# Patient Record
Sex: MALE | Race: OTHER | HISPANIC OR LATINO | URBAN - METROPOLITAN AREA
[De-identification: names, ages, dates, MRNs, and addresses within clinical notes are randomized per-mention and may not be internally consistent; named-entity substitution may affect disease eponyms.]

---

## 2019-08-22 ENCOUNTER — INPATIENT (INPATIENT)
Facility: HOSPITAL | Age: 59
LOS: 0 days | Discharge: ANOTHER HOSPITAL | DRG: 282 | End: 2019-08-23
Attending: INTERNAL MEDICINE | Admitting: INTERNAL MEDICINE
Payer: COMMERCIAL

## 2019-08-22 VITALS
HEART RATE: 66 BPM | TEMPERATURE: 98 F | DIASTOLIC BLOOD PRESSURE: 93 MMHG | WEIGHT: 210.1 LBS | SYSTOLIC BLOOD PRESSURE: 155 MMHG | RESPIRATION RATE: 18 BRPM

## 2019-08-22 DIAGNOSIS — I21.4 NON-ST ELEVATION (NSTEMI) MYOCARDIAL INFARCTION: ICD-10-CM

## 2019-08-22 DIAGNOSIS — E78.5 HYPERLIPIDEMIA, UNSPECIFIED: ICD-10-CM

## 2019-08-22 DIAGNOSIS — I10 ESSENTIAL (PRIMARY) HYPERTENSION: ICD-10-CM

## 2019-08-22 DIAGNOSIS — Z29.9 ENCOUNTER FOR PROPHYLACTIC MEASURES, UNSPECIFIED: ICD-10-CM

## 2019-08-22 LAB
ALBUMIN SERPL ELPH-MCNC: 4 G/DL — SIGNIFICANT CHANGE UP (ref 3.5–5)
ALP SERPL-CCNC: 79 U/L — SIGNIFICANT CHANGE UP (ref 40–120)
ALT FLD-CCNC: 58 U/L DA — SIGNIFICANT CHANGE UP (ref 10–60)
ANION GAP SERPL CALC-SCNC: 5 MMOL/L — SIGNIFICANT CHANGE UP (ref 5–17)
APTT BLD: 32.4 SEC — SIGNIFICANT CHANGE UP (ref 27.5–36.3)
AST SERPL-CCNC: 42 U/L — HIGH (ref 10–40)
BASOPHILS # BLD AUTO: 0.04 K/UL — SIGNIFICANT CHANGE UP (ref 0–0.2)
BASOPHILS NFR BLD AUTO: 0.4 % — SIGNIFICANT CHANGE UP (ref 0–2)
BILIRUB SERPL-MCNC: 0.5 MG/DL — SIGNIFICANT CHANGE UP (ref 0.2–1.2)
BUN SERPL-MCNC: 18 MG/DL — SIGNIFICANT CHANGE UP (ref 7–18)
CALCIUM SERPL-MCNC: 10 MG/DL — SIGNIFICANT CHANGE UP (ref 8.4–10.5)
CHLORIDE SERPL-SCNC: 103 MMOL/L — SIGNIFICANT CHANGE UP (ref 96–108)
CK MB BLD-MCNC: 2.5 % — SIGNIFICANT CHANGE UP (ref 0–3.5)
CK MB BLD-MCNC: 3.1 % — SIGNIFICANT CHANGE UP (ref 0–3.5)
CK MB CFR SERPL CALC: 4.2 NG/ML — HIGH (ref 0–3.6)
CK MB CFR SERPL CALC: 5.5 NG/ML — HIGH (ref 0–3.6)
CK SERPL-CCNC: 170 U/L — SIGNIFICANT CHANGE UP (ref 35–232)
CK SERPL-CCNC: 177 U/L — SIGNIFICANT CHANGE UP (ref 35–232)
CO2 SERPL-SCNC: 33 MMOL/L — HIGH (ref 22–31)
CREAT SERPL-MCNC: 1.02 MG/DL — SIGNIFICANT CHANGE UP (ref 0.5–1.3)
EOSINOPHIL # BLD AUTO: 0.05 K/UL — SIGNIFICANT CHANGE UP (ref 0–0.5)
EOSINOPHIL NFR BLD AUTO: 0.5 % — SIGNIFICANT CHANGE UP (ref 0–6)
GLUCOSE SERPL-MCNC: 120 MG/DL — HIGH (ref 70–99)
HCT VFR BLD CALC: 44.4 % — SIGNIFICANT CHANGE UP (ref 39–50)
HGB BLD-MCNC: 14.6 G/DL — SIGNIFICANT CHANGE UP (ref 13–17)
IMM GRANULOCYTES NFR BLD AUTO: 0.2 % — SIGNIFICANT CHANGE UP (ref 0–1.5)
INR BLD: 1.02 RATIO — SIGNIFICANT CHANGE UP (ref 0.88–1.16)
LYMPHOCYTES # BLD AUTO: 1.88 K/UL — SIGNIFICANT CHANGE UP (ref 1–3.3)
LYMPHOCYTES # BLD AUTO: 19.1 % — SIGNIFICANT CHANGE UP (ref 13–44)
MCHC RBC-ENTMCNC: 29.1 PG — SIGNIFICANT CHANGE UP (ref 27–34)
MCHC RBC-ENTMCNC: 32.9 GM/DL — SIGNIFICANT CHANGE UP (ref 32–36)
MCV RBC AUTO: 88.4 FL — SIGNIFICANT CHANGE UP (ref 80–100)
MONOCYTES # BLD AUTO: 0.73 K/UL — SIGNIFICANT CHANGE UP (ref 0–0.9)
MONOCYTES NFR BLD AUTO: 7.4 % — SIGNIFICANT CHANGE UP (ref 2–14)
NEUTROPHILS # BLD AUTO: 7.11 K/UL — SIGNIFICANT CHANGE UP (ref 1.8–7.4)
NEUTROPHILS NFR BLD AUTO: 72.4 % — SIGNIFICANT CHANGE UP (ref 43–77)
NRBC # BLD: 0 /100 WBCS — SIGNIFICANT CHANGE UP (ref 0–0)
NT-PROBNP SERPL-SCNC: 87 PG/ML — SIGNIFICANT CHANGE UP (ref 0–125)
PLATELET # BLD AUTO: 222 K/UL — SIGNIFICANT CHANGE UP (ref 150–400)
POTASSIUM SERPL-MCNC: 3.8 MMOL/L — SIGNIFICANT CHANGE UP (ref 3.5–5.3)
POTASSIUM SERPL-SCNC: 3.8 MMOL/L — SIGNIFICANT CHANGE UP (ref 3.5–5.3)
PROT SERPL-MCNC: 8.4 G/DL — HIGH (ref 6–8.3)
PROTHROM AB SERPL-ACNC: 11.3 SEC — SIGNIFICANT CHANGE UP (ref 10–12.9)
RBC # BLD: 5.02 M/UL — SIGNIFICANT CHANGE UP (ref 4.2–5.8)
RBC # FLD: 13.8 % — SIGNIFICANT CHANGE UP (ref 10.3–14.5)
SODIUM SERPL-SCNC: 141 MMOL/L — SIGNIFICANT CHANGE UP (ref 135–145)
TROPONIN I SERPL-MCNC: 1.86 NG/ML — HIGH (ref 0–0.04)
TROPONIN I SERPL-MCNC: 2.45 NG/ML — HIGH (ref 0–0.04)
WBC # BLD: 9.83 K/UL — SIGNIFICANT CHANGE UP (ref 3.8–10.5)
WBC # FLD AUTO: 9.83 K/UL — SIGNIFICANT CHANGE UP (ref 3.8–10.5)

## 2019-08-22 PROCEDURE — 71046 X-RAY EXAM CHEST 2 VIEWS: CPT | Mod: 26

## 2019-08-22 PROCEDURE — 99284 EMERGENCY DEPT VISIT MOD MDM: CPT

## 2019-08-22 PROCEDURE — 71275 CT ANGIOGRAPHY CHEST: CPT | Mod: 26

## 2019-08-22 PROCEDURE — 93010 ELECTROCARDIOGRAM REPORT: CPT | Mod: 76

## 2019-08-22 PROCEDURE — 70450 CT HEAD/BRAIN W/O DYE: CPT | Mod: 26

## 2019-08-22 RX ORDER — METOPROLOL TARTRATE 50 MG
25 TABLET ORAL
Refills: 0 | Status: DISCONTINUED | OUTPATIENT
Start: 2019-08-22 | End: 2019-08-23

## 2019-08-22 RX ORDER — ATORVASTATIN CALCIUM 80 MG/1
40 TABLET, FILM COATED ORAL AT BEDTIME
Refills: 0 | Status: DISCONTINUED | OUTPATIENT
Start: 2019-08-22 | End: 2019-08-23

## 2019-08-22 RX ORDER — LISINOPRIL 2.5 MG/1
2.5 TABLET ORAL DAILY
Refills: 0 | Status: DISCONTINUED | OUTPATIENT
Start: 2019-08-22 | End: 2019-08-23

## 2019-08-22 RX ORDER — ASPIRIN/CALCIUM CARB/MAGNESIUM 324 MG
81 TABLET ORAL DAILY
Refills: 0 | Status: DISCONTINUED | OUTPATIENT
Start: 2019-08-22 | End: 2019-08-23

## 2019-08-22 RX ORDER — HEPARIN SODIUM 5000 [USP'U]/ML
INJECTION INTRAVENOUS; SUBCUTANEOUS
Qty: 25000 | Refills: 0 | Status: DISCONTINUED | OUTPATIENT
Start: 2019-08-22 | End: 2019-08-23

## 2019-08-22 RX ORDER — ASPIRIN/CALCIUM CARB/MAGNESIUM 324 MG
325 TABLET ORAL DAILY
Refills: 0 | Status: COMPLETED | OUTPATIENT
Start: 2019-08-22 | End: 2019-08-22

## 2019-08-22 RX ADMIN — HEPARIN SODIUM 1000 UNIT(S)/HR: 5000 INJECTION INTRAVENOUS; SUBCUTANEOUS at 17:41

## 2019-08-22 RX ADMIN — ATORVASTATIN CALCIUM 40 MILLIGRAM(S): 80 TABLET, FILM COATED ORAL at 21:47

## 2019-08-22 RX ADMIN — Medication 81 MILLIGRAM(S): at 21:47

## 2019-08-22 RX ADMIN — Medication 325 MILLIGRAM(S): at 14:42

## 2019-08-22 RX ADMIN — LISINOPRIL 2.5 MILLIGRAM(S): 2.5 TABLET ORAL at 21:47

## 2019-08-22 RX ADMIN — Medication 25 MILLIGRAM(S): at 18:08

## 2019-08-22 NOTE — ED PROVIDER NOTE - OBJECTIVE STATEMENT
58 y/o with PMHx of HTN, HLD and no significant PSHx c/o CP and HA over the past 5 days. Pt describes his chest pain as a "L sided pressure that radiates down his L arm". Pt went to urgent care, had blood work/CXR, was D/C and told to F/u with PMD. Pt states pain has been persistent since onset and is unable to sleep due to  discomfort. Pt denies fever, cough, n/v/d, diaphoresis or any other acute complaints. NKDA.

## 2019-08-22 NOTE — H&P ADULT - HISTORY OF PRESENT ILLNESS
patient is 59 year old male has past medical history of htn, hld came in with chest pain 4 days.   chest pain started on Sunday while he was relaxing in his bed, 8-9 on intensity, left side, radiates to left and left neck and head, comes and goes, aggravated by exertion, relives with rest, pt did not take any pain medication. currently pt denies any chest pain.   patient went [facility] on Sunday when the chest pain started he had the work up and he was discharged.   pt has headache, throbbing in nature, 8-9 on intensity, usually occurs with chest pain but currently has headache with out chets pain, earlier he got pain medication in the ED bt did not help. pt is complaint with medication.   pt denies fever, cough, sob, palpitation, dizziness, n/v/d, abdominal pain, urinary problems.  ED course in the ED pt had ekg showed nsr, lvh, non specific st abnormality troponin was elevated to 1.86, pt was given aspirin.

## 2019-08-22 NOTE — PATIENT PROFILE ADULT - FALL HARM RISK TYPE OF ASSESSMENT
Pt initially came into PACU with a frequent hacky dry cough but has responded nicely to lidocane neb..et albuterol nebs.Pt offered no further c/o pain or nausia.. All appropriate sign outs obtained.. Ready for tx to 3C     admission

## 2019-08-22 NOTE — H&P ADULT - PROBLEM SELECTOR PLAN 1
chest pain, radiates to left, worsens with exertion   currently not in pain   ekg nsr  trop 1.8-->2.4--> will f/u t3  pt started on heparin drip for NSTEMI  c/w asa + lipitor + bb + anticoagulation   pt is not in pain  pt is to transfer to tomorrow for cardiac cath in am

## 2019-08-22 NOTE — H&P ADULT - PROBLEM SELECTOR PLAN 4
IMPROVE VTE Individual Risk Assessment  RISK                                                         Points  [  ] Previous VTE                                      3  [  ] Thrombophilia                                   2  [  ] Lower limb paralysis                         2 (unable to hold up >15 seconds)    [  ] Current Cancer                                  2       (within 6 months)  [  ] Immobilization > 24 hrs                    1  [  ] ICU/CCU stay > 24 hrs                         1  [  ] Age > 60                                              1  IMPROVE VTE Score 0  pt is on heparin drip for acs

## 2019-08-22 NOTE — CONSULT NOTE ADULT - ASSESSMENT
59 year old male has past medical history of htn, hld came in with chest pain 4 days,non-stemi.  1.Tele monitoring.  2.CE q 8h until peak.  3.Echocardiogram.  4.Heparin drip.  5.ASA,b blocker,statin.  6.Add low dose ACE.  7.PPI.  8.Cardiac cath in am.  9.Aortic aneurysm-CT in 6mo for follow-up.

## 2019-08-22 NOTE — H&P ADULT - NSHPPHYSICALEXAM_GEN_ALL_CORE
PHYSICAL EXAM:  GENERAL: NAD, well-developed  HEAD:  Atraumatic, Normocephalic  EYES: EOMI, PERRLA, conjunctiva and sclera clear  NECK: Supple, No JVD  CHEST/LUNG: Clear to auscultation bilaterally; No wheeze  HEART: Regular rate and rhythm; s1+ s2+  ABDOMEN: Soft, Nontender, Nondistended; Bowel sounds present  EXTREMITIES:, No clubbing, cyanosis, or edema  PSYCH: AAOx3  NEUROLOGY: non-focal  SKIN: No rashes or lesions

## 2019-08-22 NOTE — H&P ADULT - NSHPSOCIALHISTORY_GEN_ALL_CORE
pt denies smoking, drinks alcohol socially, denies substance abuse. lives with family, works in maintenance department

## 2019-08-22 NOTE — ED PROVIDER NOTE - CLINICAL SUMMARY MEDICAL DECISION MAKING FREE TEXT BOX
60 y/o pt presenting to ED with persistent CP and HA. Will do labs, including D-dimer, head CT, CXR and reassess.

## 2019-08-22 NOTE — CONSULT NOTE ADULT - SUBJECTIVE AND OBJECTIVE BOX
CHIEF COMPLAINT:Patient is a 59y old  Male who presents with a chief complaint of chest pain .      HPI:Patient is 59 year old male has past medical history of htn, hld came in with chest pain 4 days.   chest pain started on Sunday while he was relaxing in his bed, 8-9 on intensity, left side, radiates to left and left neck and head, comes and goes, aggravated by exertion, relives with rest, pt did not take any pain medication. currently pt denies any chest pain.   patient went [facility] on Sunday when the chest pain started he had the work up and he was discharged.   pt has headache, throbbing in nature, 8-9 on intensity, usually occurs with chest pain but currently has headache with out chets pain, earlier he got pain medication in the ED bt did not help. pt is complaint with medication.   pt denies fever, cough, sob, palpitation, dizziness, n/v/d, abdominal pain, urinary problems.  ED course in the ED pt had ekg showed nsr, lvh, non specific st abnormality troponin was elevated to 1.86, pt was given aspirin. (22 Aug 2019 16:53)      PAST MEDICAL & SURGICAL HISTORY:  HLD (hyperlipidemia)  HTN (hypertension)        MEDICATIONS  (STANDING):  aspirin enteric coated 81 milliGRAM(s) Oral daily  atorvastatin 40 milliGRAM(s) Oral at bedtime  heparin  Infusion.  Unit(s)/Hr (10 mL/Hr) IV Continuous <Continuous>  metoprolol tartrate 25 milliGRAM(s) Oral two times a day    MEDICATIONS  (PRN):      FAMILY HISTORY:No hx of CAD      SOCIAL HISTORY:    [ x] Non-smoker    [x ] Alcohol-denies    Allergies    No Known Allergies    Intolerances    	    REVIEW OF SYSTEMS:  CONSTITUTIONAL: No fever, weight loss, or fatigue  EYES: No eye pain, visual disturbances, or discharge  ENT:  No difficulty hearing, tinnitus, vertigo; No sinus or throat pain  NECK: No pain or stiffness  RESPIRATORY: No cough, wheezing, chills or hemoptysis; No Shortness of Breath  CARDIOVASCULAR: + chest pain, palpitations, passing out, dizziness, or leg swelling  GASTROINTESTINAL: No abdominal or epigastric pain. No nausea, vomiting, or hematemesis; No diarrhea or constipation. No melena or hematochezia.  GENITOURINARY: No dysuria, frequency, hematuria, or incontinence  NEUROLOGICAL: +headaches, No memory loss, loss of strength, numbness, or tremors  SKIN: No itching, burning, rashes, or lesions   LYMPH Nodes: No enlarged glands  ENDOCRINE: No heat or cold intolerance; No hair loss  MUSCULOSKELETAL: No joint pain or swelling; No muscle, back, or extremity pain  PSYCHIATRIC: No depression, anxiety, mood swings, or difficulty sleeping  HEME/LYMPH: No easy bruising, or bleeding gums  ALLERGY AND IMMUNOLOGIC: No hives or eczema	      PHYSICAL EXAM:  T(C): 36.5 (08-22-19 @ 17:58), Max: 36.8 (08-22-19 @ 10:17)  HR: 74 (08-22-19 @ 17:58) (66 - 74)  BP: 144/94 (08-22-19 @ 17:58) (137/84 - 155/93)  RR: 17 (08-22-19 @ 17:58) (17 - 18)  SpO2: 98% (08-22-19 @ 17:58) (98% - 99%)  Wt(kg): --  I&O's Summary      Appearance: Normal	  HEENT:   Normal oral mucosa, PERRL, EOMI	  Lymphatic: No lymphadenopathy  Cardiovascular: Normal S1 S2, 2/6sm  Respiratory: Lungs clear to auscultation	  Psychiatry: A & O x 3, Mood & affect appropriate  Gastrointestinal:  Soft, Non-tender, + BS	  Skin: No rashes, No ecchymoses, No cyanosis	  Neurologic: Non-focal  Extremities: Normal range of motion, No clubbing, cyanosis or edema  Vascular: Peripheral pulses palpable 2+ bilaterally  	    ECG:  	nsr,lvh,no acute st-t canges    	  LABS:	 	    CARDIAC MARKERS:  CARDIAC MARKERS ( 22 Aug 2019 17:06 )  2.450 ng/mL / x     / 170 U/L / x     / 4.2 ng/mL  CARDIAC MARKERS ( 22 Aug 2019 12:08 )  1.860 ng/mL / x     / 177 U/L / x     / 5.5 ng/mL                              14.6   9.83  )-----------( 222      ( 22 Aug 2019 12:08 )             44.4     08-22    141  |  103  |  18  ----------------------------<  120<H>  3.8   |  33<H>  |  1.02    Ca    10.0      22 Aug 2019 12:08    TPro  8.4<H>  /  Alb  4.0  /  TBili  0.5  /  DBili  x   /  AST  42<H>  /  ALT  58  /  AlkPhos  79  08-22    proBNP: Serum Pro-Brain Natriuretic Peptide: 87 pg/mL (08-22 @ 12:08)        EXAM:  CT ANGIO CHEST (W)AW IC                            PROCEDURE DATE:  08/22/2019          INTERPRETATION:  Pulmonary CTA    Indication: Chest pain and shortness of breath.    Technique: Axial multidetector CT images of the chest are acquired from   the thoracic inlet to the upper abdomen following the administration of   IV contrast (60 cc Omnipaque-350, 40 cc discarded) according to our   pulmonary embolism protocol. Subsequent MIP is also reconstructed for   evaluation.    Comparison: None.    Findings: Ectasia of the ascending aorta at 4.2 cm in diameter. No   evidence for aortic dissection. Evaluation of the pulmonary arteries and   pulmonary parenchyma is limited by respiratory motion artifact. No gross   suspicious filling defectin the central pulmonary arteries to suggest   pulmonary embolism.    No pleural or pericardial effusion. Mild cardiomegaly. No enlarged   mediastinal, hilar, or axillary lymph node.    The trachea and central bronchi are grossly patent.    No evidence for pneumothorax or pulmonary consolidation. Small bilateral   atelectasis.    Limited sections through the upper abdomen demonstrate hepatic steatosis.   Nonspecific mild adrenal thickening bilaterally.    Impression: No gross pulmonary embolism is identified.    Ectasia of the ascending aorta.    Hepatic steatosis..

## 2019-08-23 ENCOUNTER — INPATIENT (INPATIENT)
Facility: HOSPITAL | Age: 59
LOS: 0 days | Discharge: HOME | DRG: 247 | End: 2019-08-24
Attending: INTERNAL MEDICINE | Admitting: INTERNAL MEDICINE
Payer: COMMERCIAL

## 2019-08-23 VITALS
DIASTOLIC BLOOD PRESSURE: 88 MMHG | WEIGHT: 210.1 LBS | HEART RATE: 75 BPM | OXYGEN SATURATION: 97 % | RESPIRATION RATE: 18 BRPM | SYSTOLIC BLOOD PRESSURE: 149 MMHG | HEIGHT: 70 IN | TEMPERATURE: 98 F

## 2019-08-23 VITALS
SYSTOLIC BLOOD PRESSURE: 133 MMHG | DIASTOLIC BLOOD PRESSURE: 91 MMHG | RESPIRATION RATE: 18 BRPM | HEART RATE: 76 BPM | TEMPERATURE: 98 F | OXYGEN SATURATION: 97 %

## 2019-08-23 DIAGNOSIS — I21.4 NON-ST ELEVATION (NSTEMI) MYOCARDIAL INFARCTION: ICD-10-CM

## 2019-08-23 PROBLEM — E78.5 HYPERLIPIDEMIA, UNSPECIFIED: Chronic | Status: ACTIVE | Noted: 2019-08-22

## 2019-08-23 PROBLEM — I10 ESSENTIAL (PRIMARY) HYPERTENSION: Chronic | Status: ACTIVE | Noted: 2019-08-22

## 2019-08-23 LAB
ALBUMIN SERPL ELPH-MCNC: 3.8 G/DL — SIGNIFICANT CHANGE UP (ref 3.5–5)
ALP SERPL-CCNC: 75 U/L — SIGNIFICANT CHANGE UP (ref 40–120)
ALT FLD-CCNC: 50 U/L DA — SIGNIFICANT CHANGE UP (ref 10–60)
ANION GAP SERPL CALC-SCNC: 6 MMOL/L — SIGNIFICANT CHANGE UP (ref 5–17)
APTT BLD: 42.9 SEC — HIGH (ref 27.5–36.3)
APTT BLD: 61.3 SEC — HIGH (ref 27.5–36.3)
AST SERPL-CCNC: 29 U/L — SIGNIFICANT CHANGE UP (ref 10–40)
BASOPHILS # BLD AUTO: 0.04 K/UL — SIGNIFICANT CHANGE UP (ref 0–0.2)
BASOPHILS NFR BLD AUTO: 0.5 % — SIGNIFICANT CHANGE UP (ref 0–2)
BILIRUB SERPL-MCNC: 0.5 MG/DL — SIGNIFICANT CHANGE UP (ref 0.2–1.2)
BUN SERPL-MCNC: 17 MG/DL — SIGNIFICANT CHANGE UP (ref 7–18)
CALCIUM SERPL-MCNC: 9.7 MG/DL — SIGNIFICANT CHANGE UP (ref 8.4–10.5)
CHLORIDE SERPL-SCNC: 101 MMOL/L — SIGNIFICANT CHANGE UP (ref 96–108)
CHOLEST SERPL-MCNC: 157 MG/DL — SIGNIFICANT CHANGE UP (ref 10–199)
CK MB BLD-MCNC: 2.2 % — SIGNIFICANT CHANGE UP (ref 0–3.5)
CK MB CFR SERPL CALC: 3.2 NG/ML — SIGNIFICANT CHANGE UP (ref 0–3.6)
CK SERPL-CCNC: 144 U/L — SIGNIFICANT CHANGE UP (ref 35–232)
CO2 SERPL-SCNC: 32 MMOL/L — HIGH (ref 22–31)
CREAT SERPL-MCNC: 0.91 MG/DL — SIGNIFICANT CHANGE UP (ref 0.5–1.3)
EOSINOPHIL # BLD AUTO: 0.06 K/UL — SIGNIFICANT CHANGE UP (ref 0–0.5)
EOSINOPHIL NFR BLD AUTO: 0.7 % — SIGNIFICANT CHANGE UP (ref 0–6)
GLUCOSE SERPL-MCNC: 103 MG/DL — HIGH (ref 70–99)
HBA1C BLD-MCNC: 6.3 % — HIGH (ref 4–5.6)
HCT VFR BLD CALC: 41.8 % — SIGNIFICANT CHANGE UP (ref 39–50)
HCT VFR BLD CALC: 43.7 % — SIGNIFICANT CHANGE UP (ref 39–50)
HCV AB S/CO SERPL IA: 0.05 S/CO — SIGNIFICANT CHANGE UP (ref 0–0.99)
HCV AB SERPL-IMP: SIGNIFICANT CHANGE UP
HDLC SERPL-MCNC: 49 MG/DL — SIGNIFICANT CHANGE UP
HGB BLD-MCNC: 14 G/DL — SIGNIFICANT CHANGE UP (ref 13–17)
HGB BLD-MCNC: 14.4 G/DL — SIGNIFICANT CHANGE UP (ref 13–17)
IMM GRANULOCYTES NFR BLD AUTO: 0.2 % — SIGNIFICANT CHANGE UP (ref 0–1.5)
LIPID PNL WITH DIRECT LDL SERPL: 82 MG/DL — SIGNIFICANT CHANGE UP
LYMPHOCYTES # BLD AUTO: 2.55 K/UL — SIGNIFICANT CHANGE UP (ref 1–3.3)
LYMPHOCYTES # BLD AUTO: 31.2 % — SIGNIFICANT CHANGE UP (ref 13–44)
MAGNESIUM SERPL-MCNC: 2.3 MG/DL — SIGNIFICANT CHANGE UP (ref 1.6–2.6)
MCHC RBC-ENTMCNC: 28.8 PG — SIGNIFICANT CHANGE UP (ref 27–34)
MCHC RBC-ENTMCNC: 29.1 PG — SIGNIFICANT CHANGE UP (ref 27–34)
MCHC RBC-ENTMCNC: 33 GM/DL — SIGNIFICANT CHANGE UP (ref 32–36)
MCHC RBC-ENTMCNC: 33.5 GM/DL — SIGNIFICANT CHANGE UP (ref 32–36)
MCV RBC AUTO: 86.9 FL — SIGNIFICANT CHANGE UP (ref 80–100)
MCV RBC AUTO: 87.4 FL — SIGNIFICANT CHANGE UP (ref 80–100)
MONOCYTES # BLD AUTO: 0.69 K/UL — SIGNIFICANT CHANGE UP (ref 0–0.9)
MONOCYTES NFR BLD AUTO: 8.4 % — SIGNIFICANT CHANGE UP (ref 2–14)
NEUTROPHILS # BLD AUTO: 4.82 K/UL — SIGNIFICANT CHANGE UP (ref 1.8–7.4)
NEUTROPHILS NFR BLD AUTO: 59 % — SIGNIFICANT CHANGE UP (ref 43–77)
NRBC # BLD: 0 /100 WBCS — SIGNIFICANT CHANGE UP (ref 0–0)
NRBC # BLD: 0 /100 WBCS — SIGNIFICANT CHANGE UP (ref 0–0)
PHOSPHATE SERPL-MCNC: 4 MG/DL — SIGNIFICANT CHANGE UP (ref 2.5–4.5)
PLATELET # BLD AUTO: 245 K/UL — SIGNIFICANT CHANGE UP (ref 150–400)
PLATELET # BLD AUTO: 263 K/UL — SIGNIFICANT CHANGE UP (ref 150–400)
POTASSIUM SERPL-MCNC: 3.2 MMOL/L — LOW (ref 3.5–5.3)
POTASSIUM SERPL-SCNC: 3.2 MMOL/L — LOW (ref 3.5–5.3)
PROT SERPL-MCNC: 8.1 G/DL — SIGNIFICANT CHANGE UP (ref 6–8.3)
RBC # BLD: 4.81 M/UL — SIGNIFICANT CHANGE UP (ref 4.2–5.8)
RBC # BLD: 5 M/UL — SIGNIFICANT CHANGE UP (ref 4.2–5.8)
RBC # FLD: 13.8 % — SIGNIFICANT CHANGE UP (ref 10.3–14.5)
RBC # FLD: 14 % — SIGNIFICANT CHANGE UP (ref 10.3–14.5)
SODIUM SERPL-SCNC: 139 MMOL/L — SIGNIFICANT CHANGE UP (ref 135–145)
TOTAL CHOLESTEROL/HDL RATIO MEASUREMENT: 3.2 RATIO — LOW (ref 3.4–9.6)
TRIGL SERPL-MCNC: 129 MG/DL — SIGNIFICANT CHANGE UP (ref 10–149)
TROPONIN I SERPL-MCNC: 1.41 NG/ML — HIGH (ref 0–0.04)
TROPONIN I SERPL-MCNC: 2.16 NG/ML — HIGH (ref 0–0.04)
TSH SERPL-MCNC: 3.15 UU/ML — SIGNIFICANT CHANGE UP (ref 0.34–4.82)
VIT B12 SERPL-MCNC: 592 PG/ML — SIGNIFICANT CHANGE UP (ref 232–1245)
WBC # BLD: 8.18 K/UL — SIGNIFICANT CHANGE UP (ref 3.8–10.5)
WBC # BLD: 9.71 K/UL — SIGNIFICANT CHANGE UP (ref 3.8–10.5)
WBC # FLD AUTO: 8.18 K/UL — SIGNIFICANT CHANGE UP (ref 3.8–10.5)
WBC # FLD AUTO: 9.71 K/UL — SIGNIFICANT CHANGE UP (ref 3.8–10.5)

## 2019-08-23 PROCEDURE — 85379 FIBRIN DEGRADATION QUANT: CPT

## 2019-08-23 PROCEDURE — 71275 CT ANGIOGRAPHY CHEST: CPT

## 2019-08-23 PROCEDURE — 82607 VITAMIN B-12: CPT

## 2019-08-23 PROCEDURE — 80053 COMPREHEN METABOLIC PANEL: CPT

## 2019-08-23 PROCEDURE — 84484 ASSAY OF TROPONIN QUANT: CPT

## 2019-08-23 PROCEDURE — 82553 CREATINE MB FRACTION: CPT

## 2019-08-23 PROCEDURE — 85610 PROTHROMBIN TIME: CPT

## 2019-08-23 PROCEDURE — 86803 HEPATITIS C AB TEST: CPT

## 2019-08-23 PROCEDURE — 93005 ELECTROCARDIOGRAM TRACING: CPT

## 2019-08-23 PROCEDURE — 70450 CT HEAD/BRAIN W/O DYE: CPT

## 2019-08-23 PROCEDURE — 93010 ELECTROCARDIOGRAM REPORT: CPT | Mod: 76

## 2019-08-23 PROCEDURE — 93458 L HRT ARTERY/VENTRICLE ANGIO: CPT | Mod: 26,59,GC

## 2019-08-23 PROCEDURE — 99285 EMERGENCY DEPT VISIT HI MDM: CPT | Mod: 25

## 2019-08-23 PROCEDURE — 80061 LIPID PANEL: CPT

## 2019-08-23 PROCEDURE — 83880 ASSAY OF NATRIURETIC PEPTIDE: CPT

## 2019-08-23 PROCEDURE — 36415 COLL VENOUS BLD VENIPUNCTURE: CPT

## 2019-08-23 PROCEDURE — 93306 TTE W/DOPPLER COMPLETE: CPT

## 2019-08-23 PROCEDURE — 83036 HEMOGLOBIN GLYCOSYLATED A1C: CPT

## 2019-08-23 PROCEDURE — 99223 1ST HOSP IP/OBS HIGH 75: CPT

## 2019-08-23 PROCEDURE — 92941 PRQ TRLML REVSC TOT OCCL AMI: CPT | Mod: LC,GC

## 2019-08-23 PROCEDURE — 93306 TTE W/DOPPLER COMPLETE: CPT | Mod: 26

## 2019-08-23 PROCEDURE — 84443 ASSAY THYROID STIM HORMONE: CPT

## 2019-08-23 PROCEDURE — 84100 ASSAY OF PHOSPHORUS: CPT

## 2019-08-23 PROCEDURE — 85730 THROMBOPLASTIN TIME PARTIAL: CPT

## 2019-08-23 PROCEDURE — 71046 X-RAY EXAM CHEST 2 VIEWS: CPT

## 2019-08-23 PROCEDURE — 83735 ASSAY OF MAGNESIUM: CPT

## 2019-08-23 PROCEDURE — 85027 COMPLETE CBC AUTOMATED: CPT

## 2019-08-23 PROCEDURE — 99152 MOD SED SAME PHYS/QHP 5/>YRS: CPT | Mod: 25,GC

## 2019-08-23 PROCEDURE — 82550 ASSAY OF CK (CPK): CPT

## 2019-08-23 RX ORDER — ISOSORBIDE MONONITRATE 60 MG/1
1 TABLET, EXTENDED RELEASE ORAL
Qty: 0 | Refills: 0 | DISCHARGE

## 2019-08-23 RX ORDER — ASPIRIN/CALCIUM CARB/MAGNESIUM 324 MG
1 TABLET ORAL
Qty: 0 | Refills: 0 | DISCHARGE

## 2019-08-23 RX ORDER — AMLODIPINE BESYLATE 2.5 MG/1
1 TABLET ORAL
Qty: 0 | Refills: 0 | DISCHARGE

## 2019-08-23 RX ORDER — ATORVASTATIN CALCIUM 80 MG/1
1 TABLET, FILM COATED ORAL
Qty: 0 | Refills: 0 | DISCHARGE

## 2019-08-23 RX ORDER — CLOPIDOGREL BISULFATE 75 MG/1
75 TABLET, FILM COATED ORAL DAILY
Refills: 0 | Status: DISCONTINUED | OUTPATIENT
Start: 2019-08-23 | End: 2019-08-24

## 2019-08-23 RX ORDER — ATORVASTATIN CALCIUM 80 MG/1
40 TABLET, FILM COATED ORAL AT BEDTIME
Refills: 0 | Status: DISCONTINUED | OUTPATIENT
Start: 2019-08-23 | End: 2019-08-24

## 2019-08-23 RX ORDER — ASPIRIN/CALCIUM CARB/MAGNESIUM 324 MG
81 TABLET ORAL DAILY
Refills: 0 | Status: DISCONTINUED | OUTPATIENT
Start: 2019-08-23 | End: 2019-08-24

## 2019-08-23 RX ORDER — LISINOPRIL 2.5 MG/1
2.5 TABLET ORAL DAILY
Refills: 0 | Status: DISCONTINUED | OUTPATIENT
Start: 2019-08-23 | End: 2019-08-24

## 2019-08-23 RX ORDER — METOPROLOL TARTRATE 50 MG
25 TABLET ORAL
Refills: 0 | Status: DISCONTINUED | OUTPATIENT
Start: 2019-08-23 | End: 2019-08-24

## 2019-08-23 RX ORDER — METOPROLOL TARTRATE 50 MG
1 TABLET ORAL
Qty: 0 | Refills: 0 | DISCHARGE

## 2019-08-23 RX ADMIN — ATORVASTATIN CALCIUM 40 MILLIGRAM(S): 80 TABLET, FILM COATED ORAL at 21:22

## 2019-08-23 RX ADMIN — HEPARIN SODIUM 1200 UNIT(S)/HR: 5000 INJECTION INTRAVENOUS; SUBCUTANEOUS at 02:08

## 2019-08-23 RX ADMIN — LISINOPRIL 2.5 MILLIGRAM(S): 2.5 TABLET ORAL at 05:13

## 2019-08-23 RX ADMIN — HEPARIN SODIUM 1200 UNIT(S)/HR: 5000 INJECTION INTRAVENOUS; SUBCUTANEOUS at 08:58

## 2019-08-23 RX ADMIN — Medication 25 MILLIGRAM(S): at 05:13

## 2019-08-23 RX ADMIN — Medication 25 MILLIGRAM(S): at 17:25

## 2019-08-23 NOTE — DISCHARGE NOTE PROVIDER - HOSPITAL COURSE
patient is 59 year old male has past medical history of htn, hld came in with chest pain 4 days.     chest pain started on Sunday while he was relaxing in his bed, 8-9 on intensity, left side, radiates to left and left neck and head, comes and goes, aggravated by exertion, relives with rest, pt did not take any pain medication. currently pt denies any chest pain. patient went [facility] on Sunday when the chest pain started he had the work up and he was discharged. pt has headache, throbbing in nature, 8-9 on intensity, usually occurs with chest pain but currently has headache with out chests pain, earlier he got pain medication in the ED bt did not help. pt is complaint with medication. ekg showed nsr, lvh, non specific st abnormality troponin was elevated to 1.86, pt was given aspirin. NSTEMI (non-ST elevated myocardial infarction) chest pain, radiates to left, worsens with exertion ekg nsr    trop 1.8-->2.4--> 3.2. pt started on heparin drip for NSTEMI. pt started asa + lipitor + bb + anticoagulation. pt is to transfer for cardiac cath today. for HTN (hypertension) c/w lisinopril and metoprolol.      HLD (hyperlipidemia).  Plan: c/w lipitor for now.

## 2019-08-23 NOTE — PROGRESS NOTE ADULT - ASSESSMENT
59 year old male has past medical history of htn, hld came in with chest pain 4 days,non-stemi.  1.Tele monitoring.  2.Aortic aneurysm-CT in 6mo for follow-up.  3.Echocardiogram.  4.Heparin drip.  5.ASA,b blocker,statin,ACE.  6.Cardiac cath this AM.  7.PPI.

## 2019-08-23 NOTE — H&P CARDIOLOGY - HISTORY OF PRESENT ILLNESS
59 year old male has past medical history of Htn, Hld came in with chest pain 4 days to [facility] on 8/22/19. Pt reports chest pain started on Sunday while he was relaxing in his bed, 8-9 on intensity, left side, radiates to left and left neck and head, comes and goes, aggravated by exertion, relives with rest, pt did not take any pain medication. Patient went to [facility] on Sunday when the chest pain started he had the work up and he was discharged. EKG showed nsr, lvh, non specific st abnormality,  Pt was given aspirin 325 mg x 1.   Trop 1.8-->2.4--> 3.2. pt started on heparin drip for NSTEMI. pt started asa + lipitor + bb + anticoagulation.   Pt was transferred to [facility] for Cardiac Cath. 59 year old male has past medical history of Htn, Hld came in with chest pain 4 days to [facility] on 8/22/19. Pt reports chest pain started on Sunday while he was relaxing in his bed, 8-9 on intensity, left side, radiates to left and left neck and head, comes and goes, aggravated by exertion, relives with rest, pt did not take any pain medication. Patient went to [facility] on Sunday when the chest pain started he had the work up and he was discharged. EKG showed nsr, lvh, non specific st abnormality,  Pt was given aspirin 325 mg x 1.   Trop 1.8-->2.4--> 3.2. pt started on heparin drip for NSTEMI. pt started asa + lipitor + bb + anticoagulation. CTA negative for PE  Pt was transferred to [facility] for Cardiac Cath. 59 year old male has past medical history of Htn, Hld came in with chest pain 4 days to [facility] on 8/22/19. Pt reports chest pain started on Sunday while he was relaxing in his bed, 8-9 on intensity, left side, radiates to left and left neck and head, comes and goes, aggravated by exertion, relives with rest, pt did not take any pain medication. Patient went to [facility] on Sunday when the chest pain started he had the work up and he was discharged. EKG showed nsr, lvh, non specific st abnormality,  Pt was given aspirin 325 mg x 1.   Trop 1.8-->2.4--> 3.2. pt started on heparin drip for NSTEMI. pt started asa + lipitor + bb + anticoagulation. CTA negative for PE, Ectasia of the ascending aorta at 4.2 cm in diameter-pt seen by Cardiology- recommened to follow as outpt in 6 months.     < from: CT Angio Chest w/ IV Cont (08.22.19 @ 14:05) >  Findings: Ectasia of the ascending aorta at 4.2 cm in diameter. No evidence for aortic dissection. Evaluation of the pulmonary arteries and pulmonary parenchyma is limited by respiratory motion artifact. No gross suspicious filling defectin the central pulmonary arteries to suggest   pulmonary embolism. No pleural or pericardial effusion. Mild cardiomegaly. No enlarged mediastinal, hilar, or axillary lymph node. The trachea and central bronchi are grossly patent. No evidence for pneumothorax or pulmonary consolidation. Small bilateral   atelectasis. Limited sections through the upper abdomen demonstrate hepatic steatosis. Nonspecific mild adrenal thickening bilaterally.  Impression: No gross pulmonary embolism is identified. Ectasia of the ascending aorta. Hepatic steatosis..    Pt was transferred to [facility] for Cardiac Cath.

## 2019-08-23 NOTE — PROGRESS NOTE ADULT - SUBJECTIVE AND OBJECTIVE BOX
CHIEF COMPLAINT:Patient is a 59y old  Male who presents with a chief complaint of chest pain.Pt appears comfortable.    	  REVIEW OF SYSTEMS:  CONSTITUTIONAL: No fever, weight loss, or fatigue  EYES: No eye pain, visual disturbances, or discharge  ENT:  No difficulty hearing, tinnitus, vertigo; No sinus or throat pain  NECK: No pain or stiffness  RESPIRATORY: No cough, wheezing, chills or hemoptysis; No Shortness of Breath  CARDIOVASCULAR: No chest pain, palpitations, passing out, dizziness, or leg swelling  GASTROINTESTINAL: No abdominal or epigastric pain. No nausea, vomiting, or hematemesis; No diarrhea or constipation. No melena or hematochezia.  GENITOURINARY: No dysuria, frequency, hematuria, or incontinence  NEUROLOGICAL: No headaches, memory loss, loss of strength, numbness, or tremors  SKIN: No itching, burning, rashes, or lesions   LYMPH Nodes: No enlarged glands  ENDOCRINE: No heat or cold intolerance; No hair loss  MUSCULOSKELETAL: No joint pain or swelling; No muscle, back, or extremity pain  PSYCHIATRIC: No depression, anxiety, mood swings, or difficulty sleeping  HEME/LYMPH: No easy bruising, or bleeding gums  ALLERGY AND IMMUNOLOGIC: No hives or eczema	      PHYSICAL EXAM:  T(C): 36.7 (08-23-19 @ 07:00), Max: 36.9 (08-23-19 @ 00:00)  HR: 70 (08-23-19 @ 07:00) (66 - 74)  BP: 119/76 (08-23-19 @ 07:00) (111/73 - 155/93)  RR: 18 (08-23-19 @ 07:00) (17 - 20)  SpO2: 98% (08-23-19 @ 07:00) (96% - 99%)      Appearance: Normal	  HEENT:   Normal oral mucosa, PERRL, EOMI	  Lymphatic: No lymphadenopathy  Cardiovascular: Normal S1 S2, No JVD, No murmurs, No edema  Respiratory: Lungs clear to auscultation	  Psychiatry: A & O x 3, Mood & affect appropriate  Gastrointestinal:  Soft, Non-tender, + BS	  Skin: No rashes, No ecchymoses, No cyanosis	  Neurologic: Non-focal  Extremities: Normal range of motion, No clubbing, cyanosis or edema  Vascular: Peripheral pulses palpable 2+ bilaterally    MEDICATIONS  (STANDING):  aspirin enteric coated 81 milliGRAM(s) Oral daily  atorvastatin 40 milliGRAM(s) Oral at bedtime  heparin  Infusion.  Unit(s)/Hr (10 mL/Hr) IV Continuous <Continuous>  lisinopril 2.5 milliGRAM(s) Oral daily  metoprolol tartrate 25 milliGRAM(s) Oral two times a day      TELEMETRY: 	  nsr,pvc's    	  LABS:	 	      CARDIAC MARKERS ( 23 Aug 2019 00:06 )  2.160 ng/mL / x     / 144 U/L / x     / 3.2 ng/mL  CARDIAC MARKERS ( 22 Aug 2019 17:06 )  2.450 ng/mL / x     / 170 U/L / x     / 4.2 ng/mL  CARDIAC MARKERS ( 22 Aug 2019 12:08 )  1.860 ng/mL / x     / 177 U/L / x     / 5.5 ng/mL                          14.4   8.18  )-----------( 245      ( 23 Aug 2019 08:20 )             43.7     08-22    141  |  103  |  18  ----------------------------<  120<H>  3.8   |  33<H>  |  1.02    Ca    10.0      22 Aug 2019 12:08    TPro  8.4<H>  /  Alb  4.0  /  TBili  0.5  /  DBili  x   /  AST  42<H>  /  ALT  58  /  AlkPhos  79  08-22    proBNP: Serum Pro-Brain Natriuretic Peptide: 87 pg/mL (08-22 @ 12:08)
59 year old male has past medical history of htn, hld came in with chest pain 4 days.   chest pain started on Sunday while he was relaxing in his bed, 8-9 on intensity, left side, radiates to left and left neck and head, comes and goes, aggravated by exertion, relives with rest, pt did not take any pain medication. currently pt denies any chest pain.   patient went [facility] on Sunday when the chest pain started he had the work up and he was discharged.   pt has headache, throbbing in nature, 8-9 on intensity, usually occurs with chest pain but currently has headache with out chets pain, earlier he got pain medication in the ED bt did not help. pt is complaint with medication.   pt denies fever, cough, sob, palpitation, dizziness, n/v/d, abdominal pain, urinary problems.  ED course in the ED pt had ekg showed nsr, lvh, non specific st abnormality troponin was elevated to 1.86, pt was given aspirin.     Review of Systems:  Other Review of Systems: All other review of systems negative, except as noted in HPI 	      pt seen in tele [x  ], reg med floor [   ], bed [ x ], chair at bedside [   ], a+o x3 [ x ], lethargic [  ],  nad [ x ]      Allergies    No Known Allergies        Vitals    T(F): 98 (08-23-19 @ 07:00), Max: 98.4 (08-23-19 @ 00:00)  HR: 70 (08-23-19 @ 07:00) (66 - 74)  BP: 119/76 (08-23-19 @ 07:00) (111/73 - 155/93)  RR: 18 (08-23-19 @ 07:00) (17 - 20)  SpO2: 98% (08-23-19 @ 07:00) (96% - 99%)  Wt(kg): --  CAPILLARY BLOOD GLUCOSE          Labs                          14.0   9.71  )-----------( 263      ( 23 Aug 2019 00:06 )             41.8       08-22    141  |  103  |  18  ----------------------------<  120<H>  3.8   |  33<H>  |  1.02    Ca    10.0      22 Aug 2019 12:08    TPro  8.4<H>  /  Alb  4.0  /  TBili  0.5  /  DBili  x   /  AST  42<H>  /  ALT  58  /  AlkPhos  79  08-22      CARDIAC MARKERS ( 23 Aug 2019 00:06 )  2.160 ng/mL / x     / 144 U/L / x     / 3.2 ng/mL  CARDIAC MARKERS ( 22 Aug 2019 17:06 )  2.450 ng/mL / x     / 170 U/L / x     / 4.2 ng/mL  CARDIAC MARKERS ( 22 Aug 2019 12:08 )  1.860 ng/mL / x     / 177 U/L / x     / 5.5 ng/mL            Radiology Results    < from: CT Angio Chest w/ IV Cont (08.22.19 @ 14:05) >  Impression: No gross pulmonary embolism is identified.    Ectasia of the ascending aorta.    Hepatic steatosis..      < end of copied text >      Meds    MEDICATIONS  (STANDING):  aspirin enteric coated 81 milliGRAM(s) Oral daily  atorvastatin 40 milliGRAM(s) Oral at bedtime  heparin  Infusion.  Unit(s)/Hr (10 mL/Hr) IV Continuous <Continuous>  lisinopril 2.5 milliGRAM(s) Oral daily  metoprolol tartrate 25 milliGRAM(s) Oral two times a day      MEDICATIONS  (PRN):      Physical Exam    Neuro :  no focal deficits  Respiratory: CTA B/L  CV: RRR, S1S2, no murmurs,   Abdominal: Soft, NT, ND +BS,  Extremities: No edema, + peripheral pulses    ASSESSMENT    Non-ST elevation myocardial infarction (NSTEMI)  h/o HLD (hyperlipidemia)  HTN (hypertension)  No significant past surgical history      PLAN    cont tele,   acs protocol,   cont lopressor, aspirin, statin, lisinopril  ce q8 x3 positive noted above  cardio cons  Echocardiogram.  Heparin drip.  Aortic aneurysm-CT in 6mo for follow-up  cont current meds  pt for transfer for cardiac cath

## 2019-08-23 NOTE — DISCHARGE NOTE PROVIDER - NSDCCPCAREPLAN_GEN_ALL_CORE_FT
PRINCIPAL DISCHARGE DIAGNOSIS  Diagnosis: NSTEMI (non-ST elevated myocardial infarction)  Assessment and Plan of Treatment: You came in with chest pain. Your cardiac enzymes were elevated. We are transfering you to [facility] for cardiac cath.

## 2019-08-24 VITALS
SYSTOLIC BLOOD PRESSURE: 112 MMHG | OXYGEN SATURATION: 96 % | HEART RATE: 78 BPM | TEMPERATURE: 98 F | RESPIRATION RATE: 17 BRPM | DIASTOLIC BLOOD PRESSURE: 73 MMHG

## 2019-08-24 LAB
ALBUMIN SERPL ELPH-MCNC: 4.1 G/DL — SIGNIFICANT CHANGE UP (ref 3.3–5)
ALP SERPL-CCNC: 68 U/L — SIGNIFICANT CHANGE UP (ref 40–120)
ALT FLD-CCNC: 41 U/L — SIGNIFICANT CHANGE UP (ref 10–45)
ANION GAP SERPL CALC-SCNC: 15 MMOL/L — SIGNIFICANT CHANGE UP (ref 5–17)
AST SERPL-CCNC: 23 U/L — SIGNIFICANT CHANGE UP (ref 10–40)
BASOPHILS # BLD AUTO: 0 K/UL — SIGNIFICANT CHANGE UP (ref 0–0.2)
BASOPHILS NFR BLD AUTO: 0.5 % — SIGNIFICANT CHANGE UP (ref 0–2)
BILIRUB SERPL-MCNC: 0.4 MG/DL — SIGNIFICANT CHANGE UP (ref 0.2–1.2)
BUN SERPL-MCNC: 19 MG/DL — SIGNIFICANT CHANGE UP (ref 7–23)
CALCIUM SERPL-MCNC: 10.4 MG/DL — SIGNIFICANT CHANGE UP (ref 8.4–10.5)
CHLORIDE SERPL-SCNC: 102 MMOL/L — SIGNIFICANT CHANGE UP (ref 96–108)
CO2 SERPL-SCNC: 25 MMOL/L — SIGNIFICANT CHANGE UP (ref 22–31)
CREAT SERPL-MCNC: 0.88 MG/DL — SIGNIFICANT CHANGE UP (ref 0.5–1.3)
EOSINOPHIL # BLD AUTO: 0.1 K/UL — SIGNIFICANT CHANGE UP (ref 0–0.5)
EOSINOPHIL NFR BLD AUTO: 1.4 % — SIGNIFICANT CHANGE UP (ref 0–6)
GLUCOSE SERPL-MCNC: 118 MG/DL — HIGH (ref 70–99)
HCT VFR BLD CALC: 43.2 % — SIGNIFICANT CHANGE UP (ref 39–50)
HGB BLD-MCNC: 14.4 G/DL — SIGNIFICANT CHANGE UP (ref 13–17)
LYMPHOCYTES # BLD AUTO: 2.2 K/UL — SIGNIFICANT CHANGE UP (ref 1–3.3)
LYMPHOCYTES # BLD AUTO: 27 % — SIGNIFICANT CHANGE UP (ref 13–44)
MCHC RBC-ENTMCNC: 29.3 PG — SIGNIFICANT CHANGE UP (ref 27–34)
MCHC RBC-ENTMCNC: 33.3 GM/DL — SIGNIFICANT CHANGE UP (ref 32–36)
MCV RBC AUTO: 88.1 FL — SIGNIFICANT CHANGE UP (ref 80–100)
MONOCYTES # BLD AUTO: 0.9 K/UL — SIGNIFICANT CHANGE UP (ref 0–0.9)
MONOCYTES NFR BLD AUTO: 11.6 % — SIGNIFICANT CHANGE UP (ref 2–14)
NEUTROPHILS # BLD AUTO: 4.8 K/UL — SIGNIFICANT CHANGE UP (ref 1.8–7.4)
NEUTROPHILS NFR BLD AUTO: 59.5 % — SIGNIFICANT CHANGE UP (ref 43–77)
PLATELET # BLD AUTO: 225 K/UL — SIGNIFICANT CHANGE UP (ref 150–400)
POTASSIUM SERPL-MCNC: 3.5 MMOL/L — SIGNIFICANT CHANGE UP (ref 3.5–5.3)
POTASSIUM SERPL-SCNC: 3.5 MMOL/L — SIGNIFICANT CHANGE UP (ref 3.5–5.3)
PROT SERPL-MCNC: 7.7 G/DL — SIGNIFICANT CHANGE UP (ref 6–8.3)
RBC # BLD: 4.9 M/UL — SIGNIFICANT CHANGE UP (ref 4.2–5.8)
RBC # FLD: 13.5 % — SIGNIFICANT CHANGE UP (ref 10.3–14.5)
SODIUM SERPL-SCNC: 142 MMOL/L — SIGNIFICANT CHANGE UP (ref 135–145)
WBC # BLD: 8.1 K/UL — SIGNIFICANT CHANGE UP (ref 3.8–10.5)
WBC # FLD AUTO: 8.1 K/UL — SIGNIFICANT CHANGE UP (ref 3.8–10.5)

## 2019-08-24 PROCEDURE — C1874: CPT

## 2019-08-24 PROCEDURE — 99152 MOD SED SAME PHYS/QHP 5/>YRS: CPT

## 2019-08-24 PROCEDURE — C1887: CPT

## 2019-08-24 PROCEDURE — 99238 HOSP IP/OBS DSCHRG MGMT 30/<: CPT

## 2019-08-24 PROCEDURE — C9606: CPT | Mod: LC

## 2019-08-24 PROCEDURE — 93458 L HRT ARTERY/VENTRICLE ANGIO: CPT | Mod: 59

## 2019-08-24 PROCEDURE — 99153 MOD SED SAME PHYS/QHP EA: CPT

## 2019-08-24 PROCEDURE — 93005 ELECTROCARDIOGRAM TRACING: CPT

## 2019-08-24 PROCEDURE — 85027 COMPLETE CBC AUTOMATED: CPT

## 2019-08-24 PROCEDURE — C1769: CPT

## 2019-08-24 PROCEDURE — C1725: CPT

## 2019-08-24 PROCEDURE — 80053 COMPREHEN METABOLIC PANEL: CPT

## 2019-08-24 PROCEDURE — C1894: CPT

## 2019-08-24 RX ORDER — CLOPIDOGREL BISULFATE 75 MG/1
1 TABLET, FILM COATED ORAL
Qty: 90 | Refills: 3
Start: 2019-08-24 | End: 2020-08-17

## 2019-08-24 RX ORDER — LISINOPRIL 2.5 MG/1
1 TABLET ORAL
Qty: 0 | Refills: 0 | DISCHARGE

## 2019-08-24 RX ORDER — LISINOPRIL 2.5 MG/1
1 TABLET ORAL
Qty: 30 | Refills: 0
Start: 2019-08-24 | End: 2019-09-22

## 2019-08-24 RX ADMIN — CLOPIDOGREL BISULFATE 75 MILLIGRAM(S): 75 TABLET, FILM COATED ORAL at 05:20

## 2019-08-24 RX ADMIN — Medication 25 MILLIGRAM(S): at 05:20

## 2019-08-24 RX ADMIN — LISINOPRIL 2.5 MILLIGRAM(S): 2.5 TABLET ORAL at 05:20

## 2019-08-24 RX ADMIN — Medication 81 MILLIGRAM(S): at 05:20

## 2019-08-24 NOTE — DISCHARGE NOTE PROVIDER - CARE PROVIDER_API CALL
Irene Swift)  Internal Medicine  [address] [address]  Phone: [phone]  Fax: [phone]  Follow Up Time:

## 2019-08-24 NOTE — DISCHARGE NOTE PROVIDER - NSDCCPCAREPLAN_GEN_ALL_CORE_FT
PRINCIPAL DISCHARGE DIAGNOSIS  Diagnosis: CAD (coronary artery disease), native coronary artery  Assessment and Plan of Treatment: Do not stop your aspirin or Plavix unless instructed to do so by your cardiologist, they help keep your stented arteries open.   No heavy lifting or pushing/pulling with procedure arm for 2 weeks. No driving for 2 days. You may shower 24 hours following the procedure but avoid baths/swimming for 1 week. Check your wrist site for bleeding and/or swelling daily following procedure and call your doctor immediately if it occurs or if you experience increased pain at the site. Follow up with your cardiologist in 1-2 weeks. You may call [facility] if you have any questions/concerns regarding your procedure [phone].      SECONDARY DISCHARGE DIAGNOSES  Diagnosis: HTN (hypertension)  Assessment and Plan of Treatment: Continue with your blood pressure medications; eat a heart healthy diet with low salt diet; exercise regularly (consult with your physician or cardiologist first); maintain a heart healthy weight; if you smoke - quit (A resource to help you stop smoking is the [facility] [employer] Control – phone number [phone].); include healthy ways to manage stress. Continue to follow with your primary care physician or cardiologist.    Diagnosis: HLD (hyperlipidemia)  Assessment and Plan of Treatment: Continue with your cholesterol medications. Eat a heart healthy diet that is low in saturated fats and salt, and includes whole grains, fruits, vegetables and lean protein; exercise regularly (consult with your physician or cardiologist first); maintain a heart healthy weight; if you smoke - quit (A resource to help you stop smoking is the [facility] [employer] [facility] [employer] – phone number [phone].). Continue to follow with your primary physician or cardiologist.

## 2019-08-24 NOTE — DISCHARGE NOTE PROVIDER - NSDCCPTREATMENT_GEN_ALL_CORE_FT
PRINCIPAL PROCEDURE  Procedure: Placement of coronary artery stent  Findings and Treatment: one distal circ stent

## 2019-08-24 NOTE — DISCHARGE NOTE PROVIDER - HOSPITAL COURSE
HPI:    59 year old male has past medical history of Htn, Hld came in with chest pain 4 days to [facility] on 8/22/19. Pt reports chest pain started on Sunday while he was relaxing in his bed, 8-9 on intensity, left side, radiates to left and left neck and head, comes and goes, aggravated by exertion, relives with rest, pt did not take any pain medication. Patient went to [facility] on Sunday when the chest pain started he had the work up and he was discharged. EKG showed nsr, lvh, non specific st abnormality,  Pt was given aspirin 325 mg x 1.     Trop 1.8-->2.4--> 3.2. pt started on heparin drip for NSTEMI. pt started asa + lipitor + bb + anticoagulation. CTA negative for PE, Ectasia of the ascending aorta at 4.2 cm in diameter-pt seen by Cardiology- recommended to follow as outpt in 6 months.         < from: CT Angio Chest w/ IV Cont (08.22.19 @ 14:05) >    Findings: Ectasia of the ascending aorta at 4.2 cm in diameter. No evidence for aortic dissection. Evaluation of the pulmonary arteries and pulmonary parenchyma is limited by respiratory motion artifact. No gross suspicious filling defectin the central pulmonary arteries to suggest     pulmonary embolism. No pleural or pericardial effusion. Mild cardiomegaly. No enlarged mediastinal, hilar, or axillary lymph node. The trachea and central bronchi are grossly patent. No evidence for pneumothorax or pulmonary consolidation. Small bilateral     atelectasis. Limited sections through the upper abdomen demonstrate hepatic steatosis. Nonspecific mild adrenal thickening bilaterally.    Impression: No gross pulmonary embolism is identified. Ectasia of the ascending aorta. Hepatic steatosis..    Pt was transferred to [facility] for Cardiac Cath. (23 Aug 2019 12:13).        8/23 cardiac cath with one stent to the distal circ. Right radial site without swelling, bleeding.

## 2019-08-24 NOTE — DISCHARGE NOTE PROVIDER - NSDCPNSUBOBJ_GEN_ALL_CORE
Patient is a 59y old  Male who presents with a chief complaint of                 Allergies        No Known Allergies        Intolerances                Medications:    aspirin enteric coated 81 milliGRAM(s) Oral daily    atorvastatin 40 milliGRAM(s) Oral at bedtime    clopidogrel Tablet 75 milliGRAM(s) Oral daily    lisinopril 2.5 milliGRAM(s) Oral daily    metoprolol tartrate 25 milliGRAM(s) Oral two times a day            Vitals:    T(C): 36.9 (19 @ 20:05), Max: 36.9 (19 @ 20:05)    HR: 69 (19 @ 22:05) (62 - 82)    BP: 128/87 (19 @ 22:05) (111/73 - 167/104)    BP(mean): 108 (19 @ 12:13) (108 - 108)    RR: 17 (19 @ 22:05) (17 - 20)    SpO2: 98% (19 @ 22:05) (97% - 98%)    Wt(kg): --    Daily Height in cm: 177.8 (23 Aug 2019 12:13)      Daily Weight in k.3 (23 Aug 2019 12:13)    I&O's Summary        23 Aug 2019 07:01  -  24 Aug 2019 02:20    --------------------------------------------------------    IN: 660 mL / OUT: 400 mL / NET: 260 mL                    Physical Exam:    Appearance: Normal    Eyes: PERRL, EOMI    HENT: Normal oral muscosa, NC/AT    Cardiovascular: S1S2, RRR, No M/R/G, no JVD, No Lower extremity edema    Procedural Access Site: No hematoma, Non-tender to palpation, 2+ pulse, No bruit, No Ecchymosis    Respiratory: Clear to auscultation bilaterally    Gastrointestinal: Soft, Non tender, Normal Bowel Sounds    Musculoskeletal: No clubbing, No joint deformity     Neurologic: Non-focal    Lymphatic: No lymphadenopathy    Psychiatry: AAOx3, Mood & affect appropriate    Skin: No rashes, No ecchymoses, No cyanosis                139  |  101  |  17    ----------------------------<  103<H>    3.2<L>   |  32<H>  |  0.91        Ca    9.7      23 Aug 2019 08:20    Phos  4.0         Mg     2.3             TPro  8.1  /  Alb  3.8  /  TBili  0.5  /  DBili  x   /  AST  29  /  ALT  50  /  AlkPhos  75          PT/INR - ( 22 Aug 2019 13:33 )   PT: 11.3 sec;   INR: 1.02 ratio               PTT - ( 23 Aug 2019 08:22 )  PTT:61.3 sec    CARDIAC MARKERS ( 23 Aug 2019 08:20 )    1.410 ng/mL / x     / x     / x     / x        CARDIAC MARKERS ( 23 Aug 2019 00:06 )    2.160 ng/mL / x     / 144 U/L / x     / 3.2 ng/mL    CARDIAC MARKERS ( 22 Aug 2019 17:06 )    2.450 ng/mL / x     / 170 U/L / x     / 4.2 ng/mL    CARDIAC MARKERS ( 22 Aug 2019 12:08 )    1.860 ng/mL / x     / 177 U/L / x     / 5.5 ng/mL            Serum Pro-Brain Natriuretic Peptide: 87 pg/mL ( @ 12:08)        Lipid panel Total Cholesterol: 157    LDL: 82    HDL: 49    T            Hgb A1c Hemoglobin A1C, Whole Blood: 6.3 % ( @ 13:57)                                14.4     8.18  )-----------( 245      ( 23 Aug 2019 08:20 )               43.7                 ECG: SR 70 bpm        Cath: one distal circ stent        Imaging:        Interpretation of Telemetry:                CAD    Monitor groin site for swelling, bleeding    Continue ASA, Plavix         HTN    Continue antihypertensive medications with hold parameters         HLD    continue statin    confirm lipid panel results Patient is a 59y old  Male who presents with a chief complaint of chest pain                Allergies        No Known Allergies        Intolerances                Medications:    aspirin enteric coated 81 milliGRAM(s) Oral daily    atorvastatin 40 milliGRAM(s) Oral at bedtime    clopidogrel Tablet 75 milliGRAM(s) Oral daily    lisinopril 2.5 milliGRAM(s) Oral daily    metoprolol tartrate 25 milliGRAM(s) Oral two times a day            Vitals:    T(C): 36.9 (19 @ 20:05), Max: 36.9 (19 @ 20:05)    HR: 69 (19 @ 22:05) (62 - 82)    BP: 128/87 (19 @ 22:05) (111/73 - 167/104)    BP(mean): 108 (19 @ 12:13) (108 - 108)    RR: 17 (19 @ 22:05) (17 - 20)    SpO2: 98% (19 @ 22:05) (97% - 98%)    Wt(kg): --    Daily Height in cm: 177.8 (23 Aug 2019 12:13)      Daily Weight in k.3 (23 Aug 2019 12:13)    I&O's Summary        23 Aug 2019 07:01  -  24 Aug 2019 02:20    --------------------------------------------------------    IN: 660 mL / OUT: 400 mL / NET: 260 mL                    Physical Exam:    Appearance: Normal    Eyes: PERRL, EOMI    HENT: Normal oral muscosa, NC/AT    Cardiovascular: S1S2, RRR, No M/R/G, no JVD, No Lower extremity edema    Procedural Access Site: No hematoma, Non-tender to palpation, 2+ pulse, No bruit, No Ecchymosis    Respiratory: Clear to auscultation bilaterally    Gastrointestinal: Soft, Non tender, Normal Bowel Sounds    Musculoskeletal: No clubbing, No joint deformity     Neurologic: Non-focal    Lymphatic: No lymphadenopathy    Psychiatry: AAOx3, Mood & affect appropriate    Skin: No rashes, No ecchymoses, No cyanosis                139  |  101  |  17    ----------------------------<  103<H>    3.2<L>   |  32<H>  |  0.91        Ca    9.7      23 Aug 2019 08:20    Phos  4.0         Mg     2.3             TPro  8.1  /  Alb  3.8  /  TBili  0.5  /  DBili  x   /  AST  29  /  ALT  50  /  AlkPhos  75          PT/INR - ( 22 Aug 2019 13:33 )   PT: 11.3 sec;   INR: 1.02 ratio               PTT - ( 23 Aug 2019 08:22 )  PTT:61.3 sec    CARDIAC MARKERS ( 23 Aug 2019 08:20 )    1.410 ng/mL / x     / x     / x     / x        CARDIAC MARKERS ( 23 Aug 2019 00:06 )    2.160 ng/mL / x     / 144 U/L / x     / 3.2 ng/mL    CARDIAC MARKERS ( 22 Aug 2019 17:06 )    2.450 ng/mL / x     / 170 U/L / x     / 4.2 ng/mL    CARDIAC MARKERS ( 22 Aug 2019 12:08 )    1.860 ng/mL / x     / 177 U/L / x     / 5.5 ng/mL            Serum Pro-Brain Natriuretic Peptide: 87 pg/mL ( @ 12:08)        Lipid panel Total Cholesterol: 157    LDL: 82    HDL: 49    T            Hgb A1c Hemoglobin A1C, Whole Blood: 6.3 % ( @ 13:57)                                14.4     8.18  )-----------( 245      ( 23 Aug 2019 08:20 )               43.7                 ECG: SR 70 bpm        Cath: one distal circ stent        Imaging:        Interpretation of Telemetry:                CAD    Monitor groin site for swelling, bleeding    Continue ASA, Plavix         HTN    Continue antihypertensive medications with hold parameters         HLD    continue statin    confirm lipid panel results

## 2019-08-24 NOTE — DISCHARGE NOTE PROVIDER - NSDCACTIVITY_GEN_ALL_CORE
Showering allowed/No heavy lifting/straining/Stairs allowed/Walking - Outdoors allowed/Walking - Indoors allowed

## 2019-08-24 NOTE — DISCHARGE NOTE NURSING/CASE MANAGEMENT/SOCIAL WORK - NSDCDPATPORTLINK_GEN_ALL_CORE
You can access the [facility], offered by [facility], by registering with the following website: [email or web address]

## 2019-09-12 ENCOUNTER — EMERGENCY (EMERGENCY)
Facility: HOSPITAL | Age: 59
LOS: 1 days | Discharge: HOME | End: 2019-09-12
Attending: EMERGENCY MEDICINE
Payer: COMMERCIAL

## 2019-09-12 VITALS
OXYGEN SATURATION: 98 % | TEMPERATURE: 98 F | HEIGHT: 70 IN | WEIGHT: 199.96 LBS | HEART RATE: 67 BPM | RESPIRATION RATE: 16 BRPM | DIASTOLIC BLOOD PRESSURE: 110 MMHG | SYSTOLIC BLOOD PRESSURE: 159 MMHG

## 2019-09-12 VITALS
TEMPERATURE: 98 F | OXYGEN SATURATION: 100 % | RESPIRATION RATE: 17 BRPM | DIASTOLIC BLOOD PRESSURE: 89 MMHG | HEART RATE: 59 BPM | SYSTOLIC BLOOD PRESSURE: 136 MMHG

## 2019-09-12 LAB
ALBUMIN SERPL ELPH-MCNC: 3.9 G/DL — SIGNIFICANT CHANGE UP (ref 3.5–5)
ALP SERPL-CCNC: 62 U/L — SIGNIFICANT CHANGE UP (ref 40–120)
ALT FLD-CCNC: 44 U/L DA — SIGNIFICANT CHANGE UP (ref 10–60)
ANION GAP SERPL CALC-SCNC: 6 MMOL/L — SIGNIFICANT CHANGE UP (ref 5–17)
AST SERPL-CCNC: 23 U/L — SIGNIFICANT CHANGE UP (ref 10–40)
BILIRUB SERPL-MCNC: 0.3 MG/DL — SIGNIFICANT CHANGE UP (ref 0.2–1.2)
BUN SERPL-MCNC: 17 MG/DL — SIGNIFICANT CHANGE UP (ref 7–18)
CALCIUM SERPL-MCNC: 9.9 MG/DL — SIGNIFICANT CHANGE UP (ref 8.4–10.5)
CHLORIDE SERPL-SCNC: 108 MMOL/L — SIGNIFICANT CHANGE UP (ref 96–108)
CO2 SERPL-SCNC: 28 MMOL/L — SIGNIFICANT CHANGE UP (ref 22–31)
CREAT SERPL-MCNC: 1.02 MG/DL — SIGNIFICANT CHANGE UP (ref 0.5–1.3)
GLUCOSE SERPL-MCNC: 83 MG/DL — SIGNIFICANT CHANGE UP (ref 70–99)
HCT VFR BLD CALC: 39.3 % — SIGNIFICANT CHANGE UP (ref 39–50)
HGB BLD-MCNC: 12.8 G/DL — LOW (ref 13–17)
MCHC RBC-ENTMCNC: 29.2 PG — SIGNIFICANT CHANGE UP (ref 27–34)
MCHC RBC-ENTMCNC: 32.6 GM/DL — SIGNIFICANT CHANGE UP (ref 32–36)
MCV RBC AUTO: 89.5 FL — SIGNIFICANT CHANGE UP (ref 80–100)
NRBC # BLD: 0 /100 WBCS — SIGNIFICANT CHANGE UP (ref 0–0)
PLATELET # BLD AUTO: 198 K/UL — SIGNIFICANT CHANGE UP (ref 150–400)
POTASSIUM SERPL-MCNC: 3.6 MMOL/L — SIGNIFICANT CHANGE UP (ref 3.5–5.3)
POTASSIUM SERPL-SCNC: 3.6 MMOL/L — SIGNIFICANT CHANGE UP (ref 3.5–5.3)
PROT SERPL-MCNC: 7.3 G/DL — SIGNIFICANT CHANGE UP (ref 6–8.3)
RBC # BLD: 4.39 M/UL — SIGNIFICANT CHANGE UP (ref 4.2–5.8)
RBC # FLD: 13.9 % — SIGNIFICANT CHANGE UP (ref 10.3–14.5)
SODIUM SERPL-SCNC: 142 MMOL/L — SIGNIFICANT CHANGE UP (ref 135–145)
TROPONIN I SERPL-MCNC: <0.015 NG/ML — SIGNIFICANT CHANGE UP (ref 0–0.04)
WBC # BLD: 5.95 K/UL — SIGNIFICANT CHANGE UP (ref 3.8–10.5)
WBC # FLD AUTO: 5.95 K/UL — SIGNIFICANT CHANGE UP (ref 3.8–10.5)

## 2019-09-12 PROCEDURE — 84484 ASSAY OF TROPONIN QUANT: CPT

## 2019-09-12 PROCEDURE — 93005 ELECTROCARDIOGRAM TRACING: CPT

## 2019-09-12 PROCEDURE — 99283 EMERGENCY DEPT VISIT LOW MDM: CPT

## 2019-09-12 PROCEDURE — 85027 COMPLETE CBC AUTOMATED: CPT

## 2019-09-12 PROCEDURE — 36415 COLL VENOUS BLD VENIPUNCTURE: CPT

## 2019-09-12 PROCEDURE — 80053 COMPREHEN METABOLIC PANEL: CPT

## 2019-09-12 RX ORDER — KETOROLAC TROMETHAMINE 30 MG/ML
15 SYRINGE (ML) INJECTION ONCE
Refills: 0 | Status: DISCONTINUED | OUTPATIENT
Start: 2019-09-12 | End: 2019-09-12

## 2019-09-12 NOTE — ED PROVIDER NOTE - PROGRESS NOTE DETAILS
labs - Hgb 12.8, trop negative  Repeat BP improved without treatment. Encouraged pt to take BP at home and log and fu with PCP and cardio. Discussed indications for patient return to ED. Patient understood.

## 2019-09-12 NOTE — ED PROVIDER NOTE - OBJECTIVE STATEMENT
60 yo M pmh of HTN, HLD, and CAD with stent presents for evaluation of BP. Pt reported a mild temporal HA this morning, gradual onset, resolved on its own, currently without pain. But later took BP and it was 143 systolic so pt wanted eval. Currently feels well and asymptomatic. Declined translation services; opted for family at bedside to translate.

## 2019-09-12 NOTE — ED ADULT NURSE NOTE - OBJECTIVE STATEMENT
pt present to Ed c/o elevated blood pressure at home. Denies pain at this time. non diaphoretic, denies sob or difficulty breathing.

## 2019-09-12 NOTE — ED PROVIDER NOTE - PATIENT PORTAL LINK FT
You can access the FollowMyHealth Patient Portal offered by [facility] by registering at the following website: [email or web address]. By joining [facility]’s FollowMyHealth portal, you will also be able to view your health information using other applications (apps) compatible with our system.

## 2019-09-12 NOTE — ED ADULT TRIAGE NOTE - WEIGHT METHOD
PROCEDURES:  Right above knee amputation 07-Aug-2019 11:27:03  Jose C Bruce
PROCEDURES:  Jonathanine amputation below knee 01-Aug-2019 15:29:09  Marily Gage
stated

## 2024-10-23 NOTE — PATIENT PROFILE ADULT - HARM RISK FACTORS
Current patient location: [address] [place] [address] [address] [place] [address]    Is the patient currently in the state of [place]? YES    Visit mode:VIDEO    If the visit is dropped, the patient can be reconnected by: VIDEO VISIT: Text to cell phone:   Telephone Information:   Mobile [phone]       Will anyone else be joining the visit? NO  (If patient encounters technical issues they should call [phone]    Are changes needed to the allergy or medication list? No    Are refills needed on medications prescribed by this physician? NO    Rooming Documentation:  Questionnaire(s) not pre-assigned    Reason for visit: Video Visit (Recheck)    Brooke GRIMALDO     
yes
